# Patient Record
Sex: FEMALE | Race: WHITE | Employment: UNEMPLOYED | URBAN - METROPOLITAN AREA
[De-identification: names, ages, dates, MRNs, and addresses within clinical notes are randomized per-mention and may not be internally consistent; named-entity substitution may affect disease eponyms.]

---

## 2017-01-11 ENCOUNTER — OFFICE VISIT (OUTPATIENT)
Dept: OBGYN CLINIC | Facility: CLINIC | Age: 52
End: 2017-01-11

## 2017-01-11 VITALS
SYSTOLIC BLOOD PRESSURE: 102 MMHG | BODY MASS INDEX: 23.91 KG/M2 | HEIGHT: 65.75 IN | DIASTOLIC BLOOD PRESSURE: 66 MMHG | HEART RATE: 90 BPM | WEIGHT: 147 LBS

## 2017-01-11 DIAGNOSIS — N83.201 CYST OF RIGHT OVARY: ICD-10-CM

## 2017-01-11 DIAGNOSIS — Z01.419 WELL FEMALE EXAM WITH ROUTINE GYNECOLOGICAL EXAM: Primary | ICD-10-CM

## 2017-01-11 DIAGNOSIS — N95.1 PERIMENOPAUSE: ICD-10-CM

## 2017-01-11 DIAGNOSIS — Z12.4 CERVICAL CANCER SCREENING: ICD-10-CM

## 2017-01-11 PROCEDURE — 88175 CYTOPATH C/V AUTO FLUID REDO: CPT | Performed by: OBSTETRICS & GYNECOLOGY

## 2017-01-11 PROCEDURE — 99386 PREV VISIT NEW AGE 40-64: CPT | Performed by: OBSTETRICS & GYNECOLOGY

## 2017-01-11 PROCEDURE — 87624 HPV HI-RISK TYP POOLED RSLT: CPT | Performed by: OBSTETRICS & GYNECOLOGY

## 2017-01-11 NOTE — PATIENT INSTRUCTIONS
Breast Health: Breast Self-Awareness  What is breast self-awareness? Breast self-awareness is knowing how your breasts normally look and feel. Your breasts change as you go through different stages of your life.  So it’s important to learn what is normal It’s normal to be upset if you find a lump. But it’s important to contact your provider right away. Remember that most breast lumps are benign. This means they are not cancer.   Date Last Reviewed: 8/10/2015  © 5889-6930 [employer]

## 2017-01-11 NOTE — PROGRESS NOTES
GYN H&P     2017  12:50 PM    CC: Patient is here for annual and ? Ovarian cyst seen on ct-pt asymptomatic    HPI: patient is a 49year old  here for her annual gyn exam.   She has no complaints. Menses are regular.  Denies any pelvic pain or ir Social History Narrative       ROS:     Review of Systems:  General: denies fevers, chills, fatigue and malaise.    Eyes: no visual changes, denies headaches  ENT: no complaints, denies earaches, runny nose, epistaxis, throat pain or sore throat  Respir Uterus: av, mobile, non tender, normal size                     Cervix: parous, no lesions                     Adnexa: non tender, no masses, normal size           EXTREMITIES:  non tender without edema        A/P: Patient is 51 year o

## 2017-01-12 LAB — HPV I/H RISK 1 DNA SPEC QL NAA+PROBE: NEGATIVE

## 2017-09-27 ENCOUNTER — LAB SERVICES (OUTPATIENT)
Dept: OTHER | Age: 52
End: 2017-09-27

## 2017-09-27 ENCOUNTER — CHARTING TRANS (OUTPATIENT)
Dept: OTHER | Age: 52
End: 2017-09-27

## 2017-09-27 LAB
APPEARANCE: YELLOW
BILIRUBIN: NEGATIVE
COLOR: NORMAL
GLUCOSE U: NEGATIVE
KETONES: NEGATIVE
LEUKOCYTES: NORMAL
NITRITE: POSITIVE
OCCULT BLOOD: NORMAL
PH: 6.5
PROTEIN: 30
URINE SPEC GRAVITY: 1.01
UROBILINOGEN: 0.2

## 2017-10-01 LAB — BACTERIA UR CULT: NORMAL

## 2018-01-09 ENCOUNTER — NURSE ONLY (OUTPATIENT)
Dept: FAMILY MEDICINE CLINIC | Facility: CLINIC | Age: 53
End: 2018-01-09

## 2018-01-09 VITALS
WEIGHT: 146 LBS | HEART RATE: 80 BPM | OXYGEN SATURATION: 98 % | DIASTOLIC BLOOD PRESSURE: 70 MMHG | TEMPERATURE: 98 F | SYSTOLIC BLOOD PRESSURE: 116 MMHG | RESPIRATION RATE: 16 BRPM | BODY MASS INDEX: 23.46 KG/M2 | HEIGHT: 66 IN

## 2018-01-09 DIAGNOSIS — R30.0 DYSURIA: Primary | ICD-10-CM

## 2018-01-09 DIAGNOSIS — R82.90 BAD ODOR OF URINE: ICD-10-CM

## 2018-01-09 LAB
BILIRUBIN: NEGATIVE
GLUCOSE (URINE DIPSTICK): NEGATIVE MG/DL
KETONES (URINE DIPSTICK): NEGATIVE MG/DL
MULTISTIX LOT#: ABNORMAL NUMERIC
OCCULT BLOOD: NEGATIVE
PH, URINE: 5.5 (ref 4.5–8)
PROTEIN (URINE DIPSTICK): NEGATIVE MG/DL
SPECIFIC GRAVITY: 1.02 (ref 1–1.03)
URINE-COLOR: YELLOW
UROBILINOGEN,SEMI-QN: 0.2 MG/DL (ref 0–1.9)

## 2018-01-09 PROCEDURE — 81003 URINALYSIS AUTO W/O SCOPE: CPT | Performed by: NURSE PRACTITIONER

## 2018-01-09 PROCEDURE — 99213 OFFICE O/P EST LOW 20 MIN: CPT | Performed by: NURSE PRACTITIONER

## 2018-01-09 PROCEDURE — 87086 URINE CULTURE/COLONY COUNT: CPT | Performed by: NURSE PRACTITIONER

## 2018-01-09 NOTE — PATIENT INSTRUCTIONS
Dysuria     Painful urination (dysuria) is often caused by a problem in the urinary tract. Dysuria is pain felt during urination. It is often described as a burning. Learn more about this problem and how it can be treated. What causes dysuria?   Possib · Rash or joint pain  · Increased back or abdominal pain  · Enlarged painful lymph nodes (lumps) in the groin   Date Last Reviewed: 1/1/2017  © 0055-8640 [employer]. [address] [facility], [address] [place] [address]. All rights reserved.  This inform

## 2018-01-09 NOTE — PROGRESS NOTES
CHIEF COMPLAINT:   Patient presents with:  Dysuria: urine odor sx x 2 weeks. HPI:   Kitty Canavan is a 47year old female who presents with symptoms of foul urine odor. Denies urinary frequency, urgency, dysuria.  Symptoms have been persisting si APPEARANCE cloudy Clear   URINE-COLOR yellow Yellow   Mult[employer] Lot# [id number] Numeric   Multistix Expiration Date 12/18 Date         ASSESSMENT AND PLAN:   Kumar Murrieta is a 47year old female presents with UTI symptoms.     ASSESSMENT:  Dysuria  (prima Some parts of the urinalysis may be done in the provider's office and some in a lab. And, the urine sample may be checked for bacteria and yeast (urine culture). Your healthcare provider will tell you more about these tests if they are needed.   How is dysu

## 2018-01-13 ENCOUNTER — TELEPHONE (OUTPATIENT)
Dept: FAMILY MEDICINE CLINIC | Facility: CLINIC | Age: 53
End: 2018-01-13

## 2018-01-13 NOTE — TELEPHONE ENCOUNTER
Patient calling for urine culture results. Discussed with patient that results were sent via [address] Urine culture is negative for infection. Patient will follow up with PCP if symptoms do not improve.

## 2018-01-20 ENCOUNTER — TELEPHONE (OUTPATIENT)
Dept: OBGYN CLINIC | Facility: CLINIC | Age: 53
End: 2018-01-20

## 2018-01-20 DIAGNOSIS — Z12.39 SCREENING FOR MALIGNANT NEOPLASM OF BREAST: Primary | ICD-10-CM

## 2018-01-20 NOTE — TELEPHONE ENCOUNTER
Patient is scheduled for Annual; however, her mammogram from last year . Can you please put another one in.   She will be here on  for her Annual.

## 2018-01-22 ENCOUNTER — TELEPHONE (OUTPATIENT)
Dept: FAMILY MEDICINE CLINIC | Facility: CLINIC | Age: 53
End: 2018-01-22

## 2018-01-23 ENCOUNTER — OFFICE VISIT (OUTPATIENT)
Dept: FAMILY MEDICINE CLINIC | Facility: CLINIC | Age: 53
End: 2018-01-23

## 2018-01-23 ENCOUNTER — LAB ENCOUNTER (OUTPATIENT)
Dept: LAB | Age: 53
End: 2018-01-23
Attending: FAMILY MEDICINE
Payer: OTHER MISCELLANEOUS

## 2018-01-23 VITALS
RESPIRATION RATE: 18 BRPM | BODY MASS INDEX: 23.3 KG/M2 | HEART RATE: 94 BPM | HEIGHT: 66 IN | SYSTOLIC BLOOD PRESSURE: 110 MMHG | WEIGHT: 145 LBS | TEMPERATURE: 98 F | OXYGEN SATURATION: 98 % | DIASTOLIC BLOOD PRESSURE: 80 MMHG

## 2018-01-23 DIAGNOSIS — Z01.818 PREOP GENERAL PHYSICAL EXAM: Primary | ICD-10-CM

## 2018-01-23 DIAGNOSIS — Z01.818 PREOP GENERAL PHYSICAL EXAM: ICD-10-CM

## 2018-01-23 DIAGNOSIS — M75.42 SHOULDER IMPINGEMENT SYNDROME, LEFT: ICD-10-CM

## 2018-01-23 LAB
ALBUMIN SERPL-MCNC: 4 G/DL (ref 3.5–4.8)
ALP LIVER SERPL-CCNC: 53 U/L (ref 41–108)
ALT SERPL-CCNC: 28 U/L (ref 14–54)
AST SERPL-CCNC: 21 U/L (ref 15–41)
BASOPHILS # BLD AUTO: 0.05 X10(3) UL (ref 0–0.1)
BASOPHILS NFR BLD AUTO: 0.8 %
BILIRUB SERPL-MCNC: 0.4 MG/DL (ref 0.1–2)
BUN BLD-MCNC: 10 MG/DL (ref 8–20)
CALCIUM BLD-MCNC: 8.7 MG/DL (ref 8.3–10.3)
CHLORIDE: 106 MMOL/L (ref 101–111)
CO2: 30 MMOL/L (ref 22–32)
CREAT BLD-MCNC: 0.72 MG/DL (ref 0.55–1.02)
EOSINOPHIL # BLD AUTO: 0.11 X10(3) UL (ref 0–0.3)
EOSINOPHIL NFR BLD AUTO: 1.9 %
ERYTHROCYTE [DISTWIDTH] IN BLOOD BY AUTOMATED COUNT: 12.4 % (ref 11.5–16)
GLUCOSE BLD-MCNC: 91 MG/DL (ref 70–99)
HCT VFR BLD AUTO: 38.2 % (ref 34–50)
HGB BLD-MCNC: 12.5 G/DL (ref 12–16)
IMMATURE GRANULOCYTE COUNT: 0.01 X10(3) UL (ref 0–1)
IMMATURE GRANULOCYTE RATIO %: 0.2 %
LYMPHOCYTES # BLD AUTO: 2.05 X10(3) UL (ref 0.9–4)
LYMPHOCYTES NFR BLD AUTO: 34.6 %
M PROTEIN MFR SERPL ELPH: 7.6 G/DL (ref 6.1–8.3)
MCH RBC QN AUTO: 29.4 PG (ref 27–33.2)
MCHC RBC AUTO-ENTMCNC: 32.7 G/DL (ref 31–37)
MCV RBC AUTO: 89.9 FL (ref 81–100)
MONOCYTES # BLD AUTO: 0.42 X10(3) UL (ref 0.1–0.6)
MONOCYTES NFR BLD AUTO: 7.1 %
NEUTROPHIL ABS PRELIM: 3.28 X10 (3) UL (ref 1.3–6.7)
NEUTROPHILS # BLD AUTO: 3.28 X10(3) UL (ref 1.3–6.7)
NEUTROPHILS NFR BLD AUTO: 55.4 %
PLATELET # BLD AUTO: 284 10(3)UL (ref 150–450)
POTASSIUM SERPL-SCNC: 4.2 MMOL/L (ref 3.6–5.1)
RBC # BLD AUTO: 4.25 X10(6)UL (ref 3.8–5.1)
RED CELL DISTRIBUTION WIDTH-SD: 40.7 FL (ref 35.1–46.3)
SODIUM SERPL-SCNC: 141 MMOL/L (ref 136–144)
WBC # BLD AUTO: 5.9 X10(3) UL (ref 4–13)

## 2018-01-23 PROCEDURE — 93000 ELECTROCARDIOGRAM COMPLETE: CPT | Performed by: FAMILY MEDICINE

## 2018-01-23 PROCEDURE — 80053 COMPREHEN METABOLIC PANEL: CPT | Performed by: FAMILY MEDICINE

## 2018-01-23 PROCEDURE — 99242 OFF/OP CONSLTJ NEW/EST SF 20: CPT | Performed by: FAMILY MEDICINE

## 2018-01-23 PROCEDURE — 36415 COLL VENOUS BLD VENIPUNCTURE: CPT | Performed by: FAMILY MEDICINE

## 2018-01-23 PROCEDURE — 85025 COMPLETE CBC W/AUTO DIFF WBC: CPT | Performed by: FAMILY MEDICINE

## 2018-01-23 NOTE — H&P
HPI:  Here for pre-operative evaluation requested by Dr. Amaryllis Scheuermann, [address] at [facility],  [phone]  Will have left shoulder arthroscopy at [facility] on 2/16/2018. PAST MEDICAL HISTORY:  History reviewed.  No pertinent past medical Ht 66\"   Wt 145 lb   LMP 12/26/2017   SpO2 98%   BMI 23.40 kg/m²   NAD  GENERAL: well developed, well nourished, in no apparent distress. EARS: Bilateral pinna / tragus are WNL in appearance, External canals patent and without inflammation.  Bilateral t

## 2018-03-14 ENCOUNTER — OFFICE VISIT (OUTPATIENT)
Dept: OBGYN CLINIC | Facility: CLINIC | Age: 53
End: 2018-03-14

## 2018-03-14 VITALS
DIASTOLIC BLOOD PRESSURE: 60 MMHG | BODY MASS INDEX: 23.14 KG/M2 | WEIGHT: 144 LBS | HEIGHT: 66 IN | SYSTOLIC BLOOD PRESSURE: 106 MMHG

## 2018-03-14 DIAGNOSIS — Z12.4 CERVICAL CANCER SCREENING: ICD-10-CM

## 2018-03-14 DIAGNOSIS — Z01.419 WELL FEMALE EXAM WITH ROUTINE GYNECOLOGICAL EXAM: Primary | ICD-10-CM

## 2018-03-14 DIAGNOSIS — Z12.39 BREAST CANCER SCREENING: ICD-10-CM

## 2018-03-14 DIAGNOSIS — Z87.42 HX OF OVARIAN CYST: ICD-10-CM

## 2018-03-14 PROCEDURE — 88175 CYTOPATH C/V AUTO FLUID REDO: CPT | Performed by: OBSTETRICS & GYNECOLOGY

## 2018-03-14 PROCEDURE — 99396 PREV VISIT EST AGE 40-64: CPT | Performed by: OBSTETRICS & GYNECOLOGY

## 2018-03-14 PROCEDURE — 87624 HPV HI-RISK TYP POOLED RSLT: CPT | Performed by: OBSTETRICS & GYNECOLOGY

## 2018-03-14 RX ORDER — HYDROCODONE BITARTRATE AND ACETAMINOPHEN 7.5; 325 MG/1; MG/1
TABLET ORAL
Refills: 0 | COMMUNITY
Start: 2018-02-13

## 2018-03-14 RX ORDER — TRAMADOL HYDROCHLORIDE 200 MG/1
TABLET, EXTENDED RELEASE ORAL
Refills: 0 | COMMUNITY
Start: 2018-02-13

## 2018-03-14 NOTE — PROGRESS NOTES
GYN H&P     3/14/2018  1:32 PM    CC: Patient is here for annual    HPI: patient is a 47year old  here for her annual gyn exam.   She has no complaints. Menses are  Basically regular. Denies any pelvic pain or irregular vaginal discharge.    Never h Social History Narrative   None on file       ROS:     Review of Systems:  General: denies fevers, chills, fatigue and malaise.    Eyes: no visual changes, denies headaches  ENT: no complaints, denies earaches, runny nose, epistaxis, throat pain or sore normal size                     Cervix: parous, no lesions                     Adnexa: non tender, no masses, normal size          Rectal: deferred  EXTREMITIES:  non tender without edema        A/P: Patient is 47year old female with no complaints.  Here f

## 2018-03-15 LAB — HPV I/H RISK 1 DNA SPEC QL NAA+PROBE: NEGATIVE

## 2018-11-02 VITALS
SYSTOLIC BLOOD PRESSURE: 118 MMHG | BODY MASS INDEX: 22.5 KG/M2 | HEART RATE: 140 BPM | TEMPERATURE: 103.1 F | DIASTOLIC BLOOD PRESSURE: 64 MMHG | RESPIRATION RATE: 18 BRPM | WEIGHT: 140 LBS | HEIGHT: 66 IN

## 2019-04-13 ENCOUNTER — OFFICE VISIT (OUTPATIENT)
Dept: FAMILY MEDICINE CLINIC | Facility: CLINIC | Age: 54
End: 2019-04-13
Payer: COMMERCIAL

## 2019-04-13 VITALS
RESPIRATION RATE: 18 BRPM | HEART RATE: 102 BPM | DIASTOLIC BLOOD PRESSURE: 64 MMHG | TEMPERATURE: 99 F | OXYGEN SATURATION: 98 % | SYSTOLIC BLOOD PRESSURE: 108 MMHG | HEIGHT: 66 IN | BODY MASS INDEX: 22.18 KG/M2 | WEIGHT: 138 LBS

## 2019-04-13 DIAGNOSIS — J06.9 UPPER RESPIRATORY TRACT INFECTION, UNSPECIFIED TYPE: Primary | ICD-10-CM

## 2019-04-13 PROCEDURE — 99213 OFFICE O/P EST LOW 20 MIN: CPT | Performed by: NURSE PRACTITIONER

## 2019-04-13 NOTE — PROGRESS NOTES
CHIEF COMPLAINT:   Patient presents with:  Cough: non-productive, sinus pressure, ear pressure - x5 days      HPI:   Christ Castillo is a 47year old female who presents for upper respiratory symptoms for  5 days.  Patient reports sore throat only at the THROAT: Oral mucosa pink, moist. Posterior pharynx is not erythematous. no exudates. Tonsils 2/4. NECK: Supple, non-tender  LUNGS: clear to auscultation bilaterally, no wheezes or rhonchi. Breathing is non labored.   CARDIO: RRR without murmur  EXTREMITI Almost all colds involve a stuffy nose. Other common symptoms include:  · Runny nose  · Sneezing  · Sore throat  · Headache  · Cough  How is a cold treated? Colds usually last 5 to 10 days. Treatment focuses on relieving symptoms.  Treatments may include: Colds usually go away by themselves. But it’s not unusual to get another type of infection while you have a cold.  These can include:  · Sinus infection  · Lung infection, such as bronchitis or pneumonia  · Ear infection  If you have asthma or chronic bronc

## 2020-01-01 ENCOUNTER — EXTERNAL RECORD (OUTPATIENT)
Dept: OTHER | Age: 55
End: 2020-01-01

## 2020-12-06 ENCOUNTER — APPOINTMENT (OUTPATIENT)
Dept: GENERAL RADIOLOGY | Facility: HOSPITAL | Age: 55
End: 2020-12-06
Attending: EMERGENCY MEDICINE
Payer: COMMERCIAL

## 2020-12-06 ENCOUNTER — HOSPITAL ENCOUNTER (EMERGENCY)
Facility: HOSPITAL | Age: 55
Discharge: HOME | End: 2020-12-06
Attending: EMERGENCY MEDICINE
Payer: COMMERCIAL

## 2020-12-06 VITALS
SYSTOLIC BLOOD PRESSURE: 103 MMHG | TEMPERATURE: 98 F | HEIGHT: 66 IN | RESPIRATION RATE: 21 BRPM | OXYGEN SATURATION: 100 % | WEIGHT: 140 LBS | HEART RATE: 74 BPM | DIASTOLIC BLOOD PRESSURE: 72 MMHG | BODY MASS INDEX: 22.5 KG/M2

## 2020-12-06 DIAGNOSIS — R07.9 CHEST PAIN OF UNCERTAIN ETIOLOGY: Primary | ICD-10-CM

## 2020-12-06 PROCEDURE — 93010 ELECTROCARDIOGRAM REPORT: CPT

## 2020-12-06 PROCEDURE — 85379 FIBRIN DEGRADATION QUANT: CPT | Performed by: EMERGENCY MEDICINE

## 2020-12-06 PROCEDURE — 80053 COMPREHEN METABOLIC PANEL: CPT | Performed by: EMERGENCY MEDICINE

## 2020-12-06 PROCEDURE — 96360 HYDRATION IV INFUSION INIT: CPT

## 2020-12-06 PROCEDURE — 85025 COMPLETE CBC W/AUTO DIFF WBC: CPT | Performed by: EMERGENCY MEDICINE

## 2020-12-06 PROCEDURE — 71045 X-RAY EXAM CHEST 1 VIEW: CPT | Performed by: EMERGENCY MEDICINE

## 2020-12-06 PROCEDURE — 84484 ASSAY OF TROPONIN QUANT: CPT | Performed by: EMERGENCY MEDICINE

## 2020-12-06 PROCEDURE — 99285 EMERGENCY DEPT VISIT HI MDM: CPT

## 2020-12-06 PROCEDURE — 93005 ELECTROCARDIOGRAM TRACING: CPT

## 2020-12-06 PROCEDURE — 99284 EMERGENCY DEPT VISIT MOD MDM: CPT

## 2020-12-06 RX ORDER — MULTIVIT-MIN/IRON/FOLIC ACID/K 18-600-40
CAPSULE ORAL
COMMUNITY

## 2020-12-06 RX ORDER — ASPIRIN 81 MG/1
324 TABLET, CHEWABLE ORAL ONCE
Status: DISCONTINUED | OUTPATIENT
Start: 2020-12-06 | End: 2020-12-06

## 2020-12-06 RX ORDER — SODIUM CHLORIDE 9 MG/ML
INJECTION, SOLUTION INTRAVENOUS CONTINUOUS
Status: DISCONTINUED | OUTPATIENT
Start: 2020-12-06 | End: 2020-12-06

## 2020-12-06 RX ORDER — AZITHROMYCIN 250 MG/1
TABLET, FILM COATED ORAL
Qty: 1 PACKAGE | Refills: 0 | Status: SHIPPED | OUTPATIENT
Start: 2020-12-06 | End: 2020-12-11

## 2020-12-06 RX ORDER — NITROGLYCERIN 0.4 MG/1
0.4 TABLET SUBLINGUAL ONCE
Status: COMPLETED | OUTPATIENT
Start: 2020-12-06 | End: 2020-12-06

## 2020-12-06 RX ORDER — ACETAMINOPHEN 500 MG
1000 TABLET ORAL ONCE
Status: COMPLETED | OUTPATIENT
Start: 2020-12-06 | End: 2020-12-06

## 2020-12-06 RX ORDER — ASPIRIN 81 MG/1
324 TABLET, CHEWABLE ORAL ONCE
Status: COMPLETED | OUTPATIENT
Start: 2020-12-06 | End: 2020-12-06

## 2020-12-06 NOTE — ED INITIAL ASSESSMENT (HPI)
Pt presents to ER with left sided chest pain starting this morning. No SOB. No cough. No fever. Yes HA. No n/v/d. Pt is speaking clearly. Skin warm and dry. Respirations equal and nonlabored. Pt is a&ox3. Pain is described as heavy and sharp at times.

## 2020-12-06 NOTE — ED PROVIDER NOTES
Patient Seen in: [facility] Emergency Department      History   Patient presents with:  Chest Pain Angina    Stated Complaint: chest pain    HPI    Patient is a 67-year-old female who states several weeks ago she had left-sided chest discomfort last BMI 22.60 kg/m²         Physical Exam  GENERAL: Patient resting comfortably on the cart in no acute distress. HEENT: Extraocular muscles intact, pupils equal round reactive to light and accommodation. Mouth normal, neck supple, no meningismus.   LUNGS: L Patient declined Maalox or GI cocktail. Patient with constant symptoms and atypical features low risk based on heart score criteria. Patient felt comfortable going home. Recommend further outpatient evaluation primary physician or cardiology.   Patient h

## 2020-12-07 ENCOUNTER — TELEPHONE (OUTPATIENT)
Dept: CARDIOLOGY | Age: 55
End: 2020-12-07

## 2020-12-09 ENCOUNTER — OFFICE VISIT (OUTPATIENT)
Dept: CARDIOLOGY | Age: 55
End: 2020-12-09

## 2020-12-09 VITALS
BODY MASS INDEX: 25.82 KG/M2 | SYSTOLIC BLOOD PRESSURE: 110 MMHG | DIASTOLIC BLOOD PRESSURE: 62 MMHG | WEIGHT: 160 LBS | HEART RATE: 87 BPM

## 2020-12-09 DIAGNOSIS — R07.82 INTERCOSTAL PAIN: Primary | ICD-10-CM

## 2020-12-09 PROBLEM — R07.9 CHEST PAIN: Status: ACTIVE | Noted: 2020-12-09

## 2020-12-09 PROCEDURE — 99214 OFFICE O/P EST MOD 30 MIN: CPT | Performed by: INTERNAL MEDICINE

## 2020-12-09 ASSESSMENT — PATIENT HEALTH QUESTIONNAIRE - PHQ9
SUM OF ALL RESPONSES TO PHQ9 QUESTIONS 1 AND 2: 0
2. FEELING DOWN, DEPRESSED OR HOPELESS: NOT AT ALL
CLINICAL INTERPRETATION OF PHQ2 SCORE: NO FURTHER SCREENING NEEDED
SUM OF ALL RESPONSES TO PHQ9 QUESTIONS 1 AND 2: 0
CLINICAL INTERPRETATION OF PHQ9 SCORE: NO FURTHER SCREENING NEEDED
1. LITTLE INTEREST OR PLEASURE IN DOING THINGS: NOT AT ALL

## 2020-12-14 ENCOUNTER — ORDER TRANSCRIPTION (OUTPATIENT)
Dept: ADMINISTRATIVE | Facility: HOSPITAL | Age: 55
End: 2020-12-14

## 2020-12-14 DIAGNOSIS — Z11.59 ENCOUNTER FOR SCREENING FOR OTHER VIRAL DISEASES: ICD-10-CM

## 2020-12-14 DIAGNOSIS — Z01.818 PRE-PROCEDURAL EXAMINATION: Primary | ICD-10-CM

## 2020-12-18 ENCOUNTER — LAB ENCOUNTER (OUTPATIENT)
Dept: LAB | Age: 55
End: 2020-12-18
Attending: INTERNAL MEDICINE
Payer: COMMERCIAL

## 2020-12-18 DIAGNOSIS — Z01.818 PRE-PROCEDURAL EXAMINATION: ICD-10-CM

## 2020-12-18 DIAGNOSIS — Z11.59 ENCOUNTER FOR SCREENING FOR OTHER VIRAL DISEASES: ICD-10-CM

## 2020-12-18 LAB
SARS-COV-2 RNA SPEC QL NAA+PROBE: NOT DETECTED
SPECIMEN SOURCE: NORMAL

## 2020-12-21 ENCOUNTER — HOSPITAL ENCOUNTER (OUTPATIENT)
Dept: CV DIAGNOSTICS | Facility: HOSPITAL | Age: 55
Discharge: HOME | End: 2020-12-21
Attending: INTERNAL MEDICINE
Payer: COMMERCIAL

## 2020-12-21 DIAGNOSIS — R07.82 INTERCOSTAL PAIN: ICD-10-CM

## 2020-12-21 PROCEDURE — 93350 STRESS TTE ONLY: CPT | Performed by: INTERNAL MEDICINE

## 2020-12-21 PROCEDURE — 93018 CV STRESS TEST I&R ONLY: CPT | Performed by: INTERNAL MEDICINE

## 2020-12-21 PROCEDURE — 93017 CV STRESS TEST TRACING ONLY: CPT | Performed by: INTERNAL MEDICINE

## 2020-12-22 ENCOUNTER — TELEPHONE (OUTPATIENT)
Dept: CARDIOLOGY | Age: 55
End: 2020-12-22

## 2021-01-05 ENCOUNTER — CLINICAL ABSTRACT (OUTPATIENT)
Dept: HEALTH INFORMATION MANAGEMENT | Age: 56
End: 2021-01-05

## 2021-09-24 ENCOUNTER — HOSPITAL ENCOUNTER (OUTPATIENT)
Age: 56
Discharge: HOME | End: 2021-09-24
Payer: COMMERCIAL

## 2021-09-24 VITALS
SYSTOLIC BLOOD PRESSURE: 124 MMHG | DIASTOLIC BLOOD PRESSURE: 70 MMHG | OXYGEN SATURATION: 98 % | TEMPERATURE: 98 F | HEART RATE: 88 BPM | RESPIRATION RATE: 12 BRPM

## 2021-09-24 DIAGNOSIS — B34.9 VIRAL SYNDROME: ICD-10-CM

## 2021-09-24 DIAGNOSIS — N30.00 ACUTE CYSTITIS WITHOUT HEMATURIA: Primary | ICD-10-CM

## 2021-09-24 LAB
POCT BILIRUBIN URINE: NEGATIVE
POCT BLOOD URINE: NEGATIVE
POCT GLUCOSE URINE: NEGATIVE MG/DL
POCT KETONE URINE: NEGATIVE MG/DL
POCT NITRITE URINE: NEGATIVE
POCT PH URINE: 5.5 (ref 5–8)
POCT PROTEIN URINE: NEGATIVE MG/DL
POCT SPECIFIC GRAVITY URINE: 1
POCT URINE CLARITY: CLEAR
POCT UROBILINOGEN URINE: 0.2 MG/DL

## 2021-09-24 PROCEDURE — 99214 OFFICE O/P EST MOD 30 MIN: CPT

## 2021-09-24 PROCEDURE — 87086 URINE CULTURE/COLONY COUNT: CPT | Performed by: NURSE PRACTITIONER

## 2021-09-24 PROCEDURE — 81002 URINALYSIS NONAUTO W/O SCOPE: CPT | Performed by: NURSE PRACTITIONER

## 2021-09-24 PROCEDURE — 99213 OFFICE O/P EST LOW 20 MIN: CPT

## 2021-09-24 RX ORDER — CEPHALEXIN 500 MG/1
500 CAPSULE ORAL 2 TIMES DAILY
Qty: 14 CAPSULE | Refills: 0 | Status: SHIPPED | OUTPATIENT
Start: 2021-09-24 | End: 2021-10-01

## 2021-09-24 NOTE — ED INITIAL ASSESSMENT (HPI)
Patient presents to IC with c/o urinary frequency,urgency x 5 days. Home test shows +leuk. Also has some sinus congestion/pressure today. No fever.

## 2021-09-24 NOTE — ED PROVIDER NOTES
Patient Seen in: [facility]      History   Patient presents with:  Urinary Symptoms    Stated Complaint: urinary issue    Subjective:   HPI  69-year-old female presents immediate care complaining of 5 days of urinary frequency, urgency, a Tympanic membrane, ear canal and external ear normal.      Nose: Congestion present. No rhinorrhea. Mouth/Throat:      Pharynx: No oropharyngeal exudate or posterior oropharyngeal erythema.    Cardiovascular:      Rate and Rhythm: Normal rate and regul Medication List as of 9/24/2021 11:17 AM    START taking these medications    cephalexin 500 MG Oral Cap  Take 1 capsule (500 mg total) by mouth 2 (two) times daily for 7 days. , Normal, Disp-14 capsule, R-0

## 2022-04-06 ENCOUNTER — LAB ENCOUNTER (OUTPATIENT)
Dept: LAB | Age: 57
End: 2022-04-06
Attending: FAMILY MEDICINE
Payer: COMMERCIAL

## 2022-04-06 ENCOUNTER — OFFICE VISIT (OUTPATIENT)
Dept: FAMILY MEDICINE CLINIC | Facility: CLINIC | Age: 57
End: 2022-04-06
Payer: COMMERCIAL

## 2022-04-06 VITALS
SYSTOLIC BLOOD PRESSURE: 102 MMHG | TEMPERATURE: 98 F | BODY MASS INDEX: 24.11 KG/M2 | WEIGHT: 150 LBS | HEIGHT: 66 IN | HEART RATE: 82 BPM | DIASTOLIC BLOOD PRESSURE: 76 MMHG | RESPIRATION RATE: 16 BRPM | OXYGEN SATURATION: 98 %

## 2022-04-06 DIAGNOSIS — R10.11 RUQ PAIN: Primary | ICD-10-CM

## 2022-04-06 DIAGNOSIS — R10.11 RUQ PAIN: ICD-10-CM

## 2022-04-06 DIAGNOSIS — Z12.12 SCREENING FOR COLORECTAL CANCER: ICD-10-CM

## 2022-04-06 DIAGNOSIS — Z12.11 SCREENING FOR COLORECTAL CANCER: ICD-10-CM

## 2022-04-06 LAB
ALBUMIN SERPL-MCNC: 3.9 G/DL (ref 3.4–5)
ALBUMIN/GLOB SERPL: 1.2 {RATIO} (ref 1–2)
ALP LIVER SERPL-CCNC: 350 U/L
ALT SERPL-CCNC: 407 U/L
ANION GAP SERPL CALC-SCNC: 7 MMOL/L (ref 0–18)
AST SERPL-CCNC: 374 U/L (ref 15–37)
BILIRUB SERPL-MCNC: 2.2 MG/DL (ref 0.1–2)
BUN BLD-MCNC: 11 MG/DL (ref 7–18)
CALCIUM BLD-MCNC: 9.2 MG/DL (ref 8.5–10.1)
CHLORIDE SERPL-SCNC: 105 MMOL/L (ref 98–112)
CO2 SERPL-SCNC: 27 MMOL/L (ref 21–32)
CREAT BLD-MCNC: 0.7 MG/DL
FASTING STATUS PATIENT QL REPORTED: YES
GLOBULIN PLAS-MCNC: 3.2 G/DL (ref 2.8–4.4)
GLUCOSE (URINE DIPSTICK): NEGATIVE MG/DL
GLUCOSE BLD-MCNC: 100 MG/DL (ref 70–99)
KETONES (URINE DIPSTICK): NEGATIVE MG/DL
LEUKOCYTES: NEGATIVE
MULTISTIX LOT#: ABNORMAL NUMERIC
NITRITE, URINE: NEGATIVE
OCCULT BLOOD: NEGATIVE
OSMOLALITY SERPL CALC.SUM OF ELEC: 287 MOSM/KG (ref 275–295)
PH, URINE: 6 (ref 4.5–8)
POTASSIUM SERPL-SCNC: 4 MMOL/L (ref 3.5–5.1)
PROT SERPL-MCNC: 7.1 G/DL (ref 6.4–8.2)
PROTEIN (URINE DIPSTICK): 30 MG/DL
SODIUM SERPL-SCNC: 139 MMOL/L (ref 136–145)
SPECIFIC GRAVITY: 1.03 (ref 1–1.03)
UROBILINOGEN,SEMI-QN: 1 MG/DL (ref 0–1.9)

## 2022-04-06 PROCEDURE — 3078F DIAST BP <80 MM HG: CPT | Performed by: FAMILY MEDICINE

## 2022-04-06 PROCEDURE — 99213 OFFICE O/P EST LOW 20 MIN: CPT | Performed by: FAMILY MEDICINE

## 2022-04-06 PROCEDURE — 3008F BODY MASS INDEX DOCD: CPT | Performed by: FAMILY MEDICINE

## 2022-04-06 PROCEDURE — 81003 URINALYSIS AUTO W/O SCOPE: CPT | Performed by: FAMILY MEDICINE

## 2022-04-06 PROCEDURE — 80053 COMPREHEN METABOLIC PANEL: CPT

## 2022-04-06 PROCEDURE — 3074F SYST BP LT 130 MM HG: CPT | Performed by: FAMILY MEDICINE

## 2022-04-06 PROCEDURE — 36415 COLL VENOUS BLD VENIPUNCTURE: CPT

## 2022-04-07 ENCOUNTER — HOSPITAL ENCOUNTER (OUTPATIENT)
Dept: ULTRASOUND IMAGING | Facility: HOSPITAL | Age: 57
Discharge: HOME | End: 2022-04-07
Attending: FAMILY MEDICINE
Payer: COMMERCIAL

## 2022-04-07 DIAGNOSIS — R74.8 ELEVATED LIVER ENZYMES: ICD-10-CM

## 2022-04-07 PROCEDURE — 76700 US EXAM ABDOM COMPLETE: CPT | Performed by: FAMILY MEDICINE

## 2022-04-21 ENCOUNTER — TELEPHONE (OUTPATIENT)
Dept: FAMILY MEDICINE CLINIC | Facility: CLINIC | Age: 57
End: 2022-04-21

## 2022-04-21 NOTE — TELEPHONE ENCOUNTER
Can we call Sylvie Calix and have her get follow up labs to make sure resolving?     Jignesh Raman MD

## 2022-06-06 ENCOUNTER — OFFICE VISIT (OUTPATIENT)
Dept: FAMILY MEDICINE CLINIC | Facility: CLINIC | Age: 57
End: 2022-06-06
Payer: COMMERCIAL

## 2022-06-06 VITALS — OXYGEN SATURATION: 98 % | TEMPERATURE: 98 F | HEART RATE: 68 BPM

## 2022-06-06 DIAGNOSIS — N30.00 ACUTE CYSTITIS WITHOUT HEMATURIA: Primary | ICD-10-CM

## 2022-06-06 DIAGNOSIS — R39.9 URINARY SYMPTOM OR SIGN: ICD-10-CM

## 2022-06-06 LAB
BILIRUBIN: NEGATIVE
GLUCOSE (URINE DIPSTICK): NEGATIVE MG/DL
KETONES (URINE DIPSTICK): NEGATIVE MG/DL
MULTISTIX LOT#: ABNORMAL NUMERIC
NITRITE, URINE: NEGATIVE
PH, URINE: 5.5 (ref 4.5–8)
PROTEIN (URINE DIPSTICK): NEGATIVE MG/DL
SPECIFIC GRAVITY: 1.01 (ref 1–1.03)
UROBILINOGEN,SEMI-QN: 0.2 MG/DL (ref 0–1.9)

## 2022-06-06 PROCEDURE — 87186 SC STD MICRODIL/AGAR DIL: CPT | Performed by: FAMILY MEDICINE

## 2022-06-06 PROCEDURE — 87086 URINE CULTURE/COLONY COUNT: CPT | Performed by: FAMILY MEDICINE

## 2022-06-06 PROCEDURE — 87077 CULTURE AEROBIC IDENTIFY: CPT | Performed by: FAMILY MEDICINE

## 2022-06-06 RX ORDER — NITROFURANTOIN 25; 75 MG/1; MG/1
100 CAPSULE ORAL 2 TIMES DAILY
Qty: 14 CAPSULE | Refills: 0 | Status: SHIPPED | OUTPATIENT
Start: 2022-06-06 | End: 2022-06-13

## 2022-06-06 NOTE — PATIENT INSTRUCTIONS
Take antibiotics with food and plenty of water. Eat yogurt daily or use probiotics. (Vania Spencer is a good example of an OTC probiotic)  Make sure to finish the entire antibiotic treatment. We will send the urine specimen for culture and call you in 2-3 days with results  You may take otc pyridium for urinary discomfort if needed. Increase fluid intake and bladder emptying. Return in 3 days if not better. Call if fever, vomiting, worsening symptoms.

## 2022-08-25 ENCOUNTER — ORDER TRANSCRIPTION (OUTPATIENT)
Dept: ADMINISTRATIVE | Facility: HOSPITAL | Age: 57
End: 2022-08-25

## 2022-08-25 DIAGNOSIS — Z12.31 ENCOUNTER FOR SCREENING MAMMOGRAM FOR MALIGNANT NEOPLASM OF BREAST: Primary | ICD-10-CM

## 2022-08-30 ENCOUNTER — HOSPITAL ENCOUNTER (OUTPATIENT)
Dept: MAMMOGRAPHY | Age: 57
Discharge: HOME | End: 2022-08-30
Attending: FAMILY MEDICINE
Payer: COMMERCIAL

## 2022-08-30 DIAGNOSIS — Z12.31 ENCOUNTER FOR SCREENING MAMMOGRAM FOR MALIGNANT NEOPLASM OF BREAST: ICD-10-CM

## 2022-08-30 PROCEDURE — 77067 SCR MAMMO BI INCL CAD: CPT | Performed by: OBSTETRICS & GYNECOLOGY

## 2022-08-30 PROCEDURE — 77063 BREAST TOMOSYNTHESIS BI: CPT | Performed by: OBSTETRICS & GYNECOLOGY

## 2025-06-06 ENCOUNTER — OFFICE VISIT (OUTPATIENT)
Dept: FAMILY MEDICINE CLINIC | Facility: CLINIC | Age: 60
End: 2025-06-06
Payer: COMMERCIAL

## 2025-06-06 VITALS
HEART RATE: 94 BPM | OXYGEN SATURATION: 96 % | WEIGHT: 143 LBS | RESPIRATION RATE: 16 BRPM | DIASTOLIC BLOOD PRESSURE: 58 MMHG | HEIGHT: 66 IN | BODY MASS INDEX: 22.98 KG/M2 | SYSTOLIC BLOOD PRESSURE: 104 MMHG

## 2025-06-06 DIAGNOSIS — Z13.29 THYROID DISORDER SCREEN: ICD-10-CM

## 2025-06-06 DIAGNOSIS — Z13.220 LIPID SCREENING: ICD-10-CM

## 2025-06-06 DIAGNOSIS — Z00.00 WELLNESS EXAMINATION: Primary | ICD-10-CM

## 2025-06-06 DIAGNOSIS — Z13.0 SCREENING FOR DEFICIENCY ANEMIA: ICD-10-CM

## 2025-06-06 NOTE — PROGRESS NOTES
HPI:     Laura Chun is a 59 year old female who presents for an Annual Health Visit.      Has been a while since she had [facility] visit.    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  In Summer there more often.  Son is back from college,  Daughter living in [place].  Mostly walks for exercises.      Allergies:   No Known Allergies    CURRENT MEDICATIONS:   No current outpatient medications on file.      MEDICAL INFORMATION:   History reviewed. No pertinent past medical history.   Past Surgical History:   Procedure Laterality Date      , ,     Other surgical history Left 2018    repair    Tonsillectomy        Family History   Problem Relation Age of Onset    Cancer Father         prostate    Heart Disease Father     Arthritis Mother         rhematoid    Cancer Maternal Grandmother         ovarian    Heart Disease Maternal Grandfather     Heart Attack Maternal Grandfather     Neurological Disorder Paternal Grandmother         ALS    Breast Cancer Sister 50      SOCIAL HISTORY:   Social History     Socioeconomic History    Marital status:    Tobacco Use    Smoking status: Never    Smokeless tobacco: Never   Substance and Sexual Activity    Alcohol use: Yes     Comment: Occassional not weekly    Drug use: No    Sexual activity: Yes     Partners: Male   Other Topics Concern    Caffeine Concern Yes     Comment: 1 daily    Exercise Yes     Comment: 3-4x cycle or step     Social Drivers of Health      Received from [facility]    Housing Stability     Social History     Social History Narrative    Not on file        REVIEW OF SYSTEMS:     Constitutional: negative  Eyes: negative  ENT: negative  Respiratory: negative  Cardiovascular: negative  Gastrointestinal: negative  Integument/Breast: see HPI  Genitourinary: negative  Heme/Lymph: negative  Musculoskeletal: negative  Neurological: negative  Psych:  negative  Endocrine: negative  Allergic/Immune: negative        EXAM:   /58   Pulse 94   Resp 16   Ht 5' 6\" (1.676 m)   Wt 143 lb (64.9 kg)   SpO2 96%   BMI 23.08 kg/m²    Wt Readings from Last 6 Encounters:   06/06/25 143 lb (64.9 kg)   04/06/22 150 lb (68 kg)   12/06/20 140 lb (63.5 kg)   04/13/19 138 lb (62.6 kg)   03/14/18 144 lb (65.3 kg)   01/23/18 145 lb (65.8 kg)     Body mass index is 23.08 kg/m².    General: alert, appears stated age, and cooperative  Head: Normocephalic, without obvious abnormality, atraumatic  Eyes: conjunctivae/corneas clear. PERRL, EOM's intact. Fundi benign.  Ears: normal TM's and external ear canals both ears  Nose: Nares normal. Septum midline. Mucosa normal. No drainage or sinus tenderness.  Throat: lips, mucosa, and tongue normal; teeth and gums normal  Neck: no adenopathy, no carotid bruit, no JVD, supple, symmetrical, trachea midline, and thyroid not enlarged, symmetric, no tenderness/mass/nodules  Heart: S1, S2 normal, no murmur, click, rub or gallop, regular rate and rhythm  Lungs: clear to auscultation bilaterally  Breast: deferred  Abdomen: soft, non-tender; bowel sounds normal; no masses,  no organomegaly  Pelvic: deferred  Back: symmetric, no curvature. ROM normal. No CVA tenderness.  Extremities: extremities normal, atraumatic, no cyanosis or edema  Pulses: 2+ and symmetric  Skin: Skin color, texture, turgor normal. No rashes or lesions  Lymph Nodes: Cervical, supraclavicular, and axillary nodes normal.  Neurologic: Grossly normal      ASSESSMENT AND PLAN:   Laura was seen today for physical.    Diagnoses and all orders for this visit:    Wellness examination  -     Comp Metabolic Panel (14); Future  -     CBC With Differential With Platelet; Future  -     Lipid Panel; Future  -     TSH W Reflex To Free T4; Future    Lipid screening  -     Lipid Panel; Future    Screening for deficiency anemia  -     CBC With Differential With Platelet; Future    Thyroid  disorder screen  -     TSH W Reflex To Free T4; Future      There are no Patient Instructions on file for this visit.  Will get screening labs  The patient indicates understanding of these issues and agrees to the plan.    Problem List:  Patient Active Problem List   Diagnosis    Shoulder impingement syndrome, left       Chilo Up MD  6/6/2025  2:37 PM

## (undated) DIAGNOSIS — R74.8 ELEVATED LIVER ENZYMES: Primary | ICD-10-CM

## (undated) NOTE — MR AVS SNAPSHOT
[address], [facility] [address] [address] [phone]               Thank you for choosing us for your health care visit with Adal Franks MD.  We are glad to serve you and happy to provide you with this villalobos away. If you see any changes, report them to your healthcare provider. Changes to look for  Call your healthcare provider if you find any changes in your breasts that concern you.  These changes may include:  · A lump  · Nipple discharge other than breast Hpv Dna  High Risk , Thin Prep Collect    Complete by:  Jan 11, 2017    Assoc Dx:   Well female exam with routine gynecological exam [U87.602], Cervical cancer screening [Z12.4]           MANUEL DIGITAL SCRN BILAT W/CAD (CPT=/19407)    Complete by:  Jan